# Patient Record
(demographics unavailable — no encounter records)

---

## 2025-03-07 NOTE — HISTORY OF PRESENT ILLNESS
[FreeTextEntry1] : This is a 24yo female with PMH of Hypothyroidism on levothyroxine, Thyroid nodule, Recently diagnosed paranoid Disorder, Anxiety and Psychomotor slowing, who presents for evaluation of outpatient labs showing elevated cortisol levels .   She presents with her father who answers most of the questions. Noted during visit that patient is very guarded and has delayed response to questions and flat affect.   Previous endocrinologist: Frandy Hawkins   Per Dad:   Patient was a high achiever, graduated undergrad in 2023 went to work January 2024. Was stressed and felt that people at work for following her home and tracking her. Became agitated and was hospitalized and was started on risperidone. As a results she developed amenorrhea and galactorrhea at which point Risperidone was discontinued. She was let go from her job. She started a new job in November 2024, became very suspicious and anxious and has not been able to work. During this time she also began training for a marathon which she is still training for currently. Mother reports irregular menstrual cycle lengths particularly length of periods have decreased to 2-3 days. She had labs at [facility] on 2/23/25 that showed a cortisol total level of 24.5 collected at 9am which was elevated.  Family noticed worsening staring into space, slow response, short term memory issues and brain fog. She was seeing a new psychiatrist and sent to the emergency for evaluation for worsening symptoms. She also had reports significant fatigue after running.   She was hospitalized from February 25 to March 3 2025 at [facility]. Per review of records in [facility], she had extensive work up for secondary etiology. She had CT chest/abdomen/ pelvis that was unremarkable. She had MRI of head that showed acute sinusitis but was otherwise unremarkable. She has had LP to r/o neurological reasons for these psychiatric changes. Toro panel pending, but csf chemistry was wnl and viral csf panel was negative. EEG was performed which showed no seizure activity. Labs during that admission significant for elevated CK and LFTs consistent with rhabdomyolysis which was treated with IV fluids and resolved. Psychiatry was consulted and she was started on olanzapine 5mg, which showed some improvement in her mental status per father. Endocrinology was consulted as well during hospitalization for Cushing's rule out given remote hx of elevated cortisol level. There was low suspicion of Cushing's psychosis given she did not have any signs (No metabolic derangement, no Cushingoid features).   Today she reports swelling in her hands and in her feet. Reports fatigue and constipation. She is compliant with her medications. Denies easy bruising of skin, denies weight gain, denies palpitations, denies abdominal striae, denies acne/ increased hair growth. Menarche at age 10. Currently with regular cycles but shorter days of bleed 2 days recently.   Hypothyroid Hx Diagnosed more than 3 years ago on blood work.  Takes levothyroxine 50mcg qd, takes correctly in the morning.  Family HX of hypothyroid in grandmother. Maternal grandmother has throat cancer but it is not  TSH in hospital 2/25/25: 2.423   Allergies: No allergies   Medications: Levothyroxine 50mcg qd  Olanzapine 5mg qd   Smoke: Denies Alcohol: No, occasional glass of wine

## 2025-03-07 NOTE — PHYSICAL EXAM
[Alert] : alert [No Acute Distress] : no acute distress [Normal Sclera/Conjunctiva] : normal sclera/conjunctiva [Normal Outer Ear/Nose] : the ears and nose were normal in appearance [Normal Hearing] : hearing was normal [Normal Nasal Mucosa] : the nasal mucosa was normal [No Neck Mass] : no neck mass was observed [No LAD] : no lymphadenopathy [Thyroid Not Enlarged] : the thyroid was not enlarged [No Thyroid Nodules] : no palpable thyroid nodules [No Respiratory Distress] : no respiratory distress [No Accessory Muscle Use] : no accessory muscle use [Clear to Auscultation] : lungs were clear to auscultation bilaterally [Normal S1, S2] : normal S1 and S2 [Regular Rhythm] : with a regular rhythm [No Edema] : no peripheral edema [Normal Bowel Sounds] : normal bowel sounds [Soft] : abdomen soft [Normal Gait] : normal gait [No Rash] : no rash [No Skin Lesions] : no skin lesions [Oriented x3] : oriented to person, place, and time [Abdominal Striae] : no abdominal striae [Acanthosis Nigricans] : no acanthosis nigricans

## 2025-03-07 NOTE — HISTORY OF PRESENT ILLNESS
[FreeTextEntry1] : This is a 22yo female with PMH of Hypothyroidism on levothyroxine, Thyroid nodule, Recently diagnosed paranoid Disorder, Anxiety and Psychomotor slowing, who presents for evaluation of outpatient labs showing elevated cortisol levels .   She presents with her father who answers most of the questions. Noted during visit that patient is very guarded and has delayed response to questions and flat affect.   Previous endocrinologist: Frandy Hawkins   Per Dad:   Patient was a high achiever, graduated undergrad in 2023 went to work January 2024. Was stressed and felt that people at work for following her home and tracking her. Became agitated and was hospitalized and was started on risperidone. As a results she developed amenorrhea and galactorrhea at which point Risperidone was discontinued. She was let go from her job. She started a new job in November 2024, became very suspicious and anxious and has not been able to work. During this time she also began training for a marathon which she is still training for currently. Mother reports irregular menstrual cycle lengths particularly length of periods have decreased to 2-3 days. She had labs at [facility] on 2/23/25 that showed a cortisol total level of 24.5 collected at 9am which was elevated.  Family noticed worsening staring into space, slow response, short term memory issues and brain fog. She was seeing a new psychiatrist and sent to the emergency for evaluation for worsening symptoms. She also had reports significant fatigue after running.   She was hospitalized from February 25 to March 3 2025 at [facility]. Per review of records in [facility], she had extensive work up for secondary etiology. She had CT chest/abdomen/ pelvis that was unremarkable. She had MRI of head that showed acute sinusitis but was otherwise unremarkable. She has had LP to r/o neurological reasons for these psychiatric changes. Toro panel pending, but csf chemistry was wnl and viral csf panel was negative. EEG was performed which showed no seizure activity. Labs during that admission significant for elevated CK and LFTs consistent with rhabdomyolysis which was treated with IV fluids and resolved. Psychiatry was consulted and she was started on olanzapine 5mg, which showed some improvement in her mental status per father. Endocrinology was consulted as well during hospitalization for Cushing's rule out given remote hx of elevated cortisol level. There was low suspicion of Cushing's psychosis given she did not have any signs (No metabolic derangement, no Cushingoid features).   Today she reports swelling in her hands and in her feet. Reports fatigue and constipation. She is compliant with her medications. Denies easy bruising of skin, denies weight gain, denies palpitations, denies abdominal striae, denies acne/ increased hair growth. Menarche at age 10. Currently with regular cycles but shorter days of bleed 2 days recently.   Hypothyroid Hx Diagnosed more than 3 years ago on blood work.  Takes levothyroxine 50mcg qd, takes correctly in the morning.  Family HX of hypothyroid in grandmother. Maternal grandmother has throat cancer but it is not  TSH in hospital 2/25/25: 2.423   Allergies: No allergies   Medications: Levothyroxine 50mcg qd  Olanzapine 5mg qd   Smoke: Denies Alcohol: No, occasional glass of wine

## 2025-03-07 NOTE — REVIEW OF SYSTEMS
[Fatigue] : no fatigue [Decreased Appetite] : appetite not decreased [Visual Field Defect] : no visual field defect [Dry Eyes] : no dryness [Dysphagia] : no dysphagia [Neck Pain] : no neck pain [Dysphonia] : no dysphonia [Chest Pain] : no chest pain [Slow Heart Rate] : heart rate is not slow [Palpitations] : no palpitations [Fast Heart Rate] : heart rate is not fast [Shortness Of Breath] : no shortness of breath [Cough] : no cough [Nausea] : no nausea [Constipation] : no constipation [Vomiting] : no vomiting [Diarrhea] : no diarrhea [Polyuria] : no polyuria [Irregular Menses] : regular menses [Joint Pain] : no joint pain [Muscle Weakness] : no muscle weakness [Acanthosis] : no acanthosis  [Acne] : no acne [Dry Skin] : no dry skin [Headaches] : no headaches [Dizziness] : no dizziness [Tremors] : no tremors [Polydipsia] : no polydipsia [Cold Intolerance] : no cold intolerance [Heat Intolerance] : no heat intolerance

## 2025-03-07 NOTE — ASSESSMENT
[FreeTextEntry1] : This is a 22yo female with PMH of Hypothyroidism on levothyroxine, Thyroid nodule, recently diagnosed paranoid Disorder, Anxiety and Psychomotor slowing, who presents for evaluation of outpatient labs showing elevated cortisol levels.  #Elevated Cortisol #Irregular Menstrual cycles  - She had one random cortisol elevation. She has no other symptoms of Cushing's and does not appear cushingoid. She has no metabolic derangements otherwise. MRI of Brain at [facility] also unremarkable.  - She has been training for marathon and has had poor PO intake. She also had rhabdomyolysis from excessive running during the last hospitalization. Menstrual irregularities can be due to excessive training.  - Suspect cognitive changes/ paranoia are more psychiatric in nature given extensive work up for secondary etiology at [facility] has been unremarkable.   Plan: - Will obtain midnight cortisol levels x2 although lows suspicion for Cushing's  - F/U FSH, LH, estradiol, progesterone, testosterone and prolactin level to work up menstrual irregularity.  - RTC in 3 months.  - Will call patient/ mother (Amy [phone]) with lab results.   #Hypothyroid - doubt symptoms are due to thyroid as her TFTs recently were normal  - C/w levothyroxine 50mcg qd  - F/u TFTs and thyroid antibodies.   Wanda Cristobal Endocrinology Fellow Discussed with Dr. Harding

## 2025-03-07 NOTE — ASSESSMENT
[FreeTextEntry1] : This is a 22yo female with PMH of Hypothyroidism on levothyroxine, Thyroid nodule, recently diagnosed paranoid Disorder, Anxiety and Psychomotor slowing, who presents for evaluation of outpatient labs showing elevated cortisol levels.  #Elevated Cortisol #Irregular Menstrual cycles  - She had one random cortisol elevation. She has no other symptoms of Cushing's and does not appear cushingoid. She has no metabolic derangements otherwise. MRI of Brain at [facility] also unremarkable.  - She has been training for marathon and has had poor PO intake. She also had rhabdomyolysis from excessive running during the last hospitalization. Menstrual irregularities can be due to excessive training.  - Suspect cognitive changes/ paranoia are more psychiatric in nature given extensive work up for secondary etiology at [facility] has been unremarkable.   Plan: - Will obtain midnight cortisol levels x2 although lows suspicion for Cushing's  - F/U FSH, LH, estradiol, progesterone, testosterone and prolactin level to work up menstrual irregularity.  - RTC in 3 months.  - Will call patient/ mother (Amy [phone]) with lab results.   #Hypothyroid - doubt symptoms are due to thyroid as her TFTs recently were normal  - C/w levothyroxine 50mcg qd  - F/u TFTs and thyroid antibodies.   Wanda Cristobal Endocrinology Fellow Discussed with Dr. Harding

## 2025-03-07 NOTE — END OF VISIT
[FreeTextEntry3] : I agree with the plan of Dr. Cristobal Will check salivary cortisol levels x 2 for Cushings disease ( low suspicion)  Will check thyroid ultrasound 2/2 previous history of thyroid nodules

## 2025-04-03 NOTE — PSYCHOSOCIAL ASSESSMENT
[None known] : None known [Competitive and integrated employment] : Competitive and integrated employment [35 hours or more] : 35 hours or more [Financially stable] : financially stable [Client's parents (biological, adoptive, stepparent)] : client's parents (biological, adoptive, stepparent) [No] : Patient has personal representation (legal guardian, representative payee, conservatorship)? No [FreeTextEntry2] : Client has several strengths including motivation for psychotherapy, supportive family, long-term partner, hobbies and interests, educational attainment, and life goals [FreeTextEntry3] : Client and mother report that prior to February 2024, client had good social functioning [FreeTextEntry1] : client currently working F/T but situation complicated by recent hospitalization  [FreeTextEntry7] : Amy (Mother)

## 2025-04-03 NOTE — HISTORY OF PRESENT ILLNESS
[Not Applicable] : Not applicable [FreeTextEntry1] : Client is a 23-year-old, cis-gender, , female, employed as , domiciled with family (mother, sister, grandmother) in private residence, no past hx of SI/I/P, no past hx of HI/I/P, no substance use, no trauma hx, hx of hypothyroidism ( treated w/ levothyroxine) with 1 past psychiatric hospitalization (February 2025) for "acute psychosis", 1 past CPEP visit (February 2024- not admitted- prescribed risperidone), hx of outpatient mental health services (psychotherapy & psychiatry Feb 2024-June 2024), currently presenting for eligibility screen for OTNY services in setting of recent hospital discharge for acute psychosis.  [FreeTextEntry2] : Client reported no past hx of mental health services prior to February 2024. In February 2024 client began her first job, which she found stressful. Her mother noticed changes in her behavior including speech latency, alogia, affective blunting, and mild paranoia (fears someone from work was following her, worried about office gossip/bullying). At mother's encouragement client went to [facility], was treated for "psychosis with paranoia" with Risperidone and was not admitted. Client then saw private psychiatrist (Daniel Danielle MD) and psychotherapist (Jackson Felix) until June 2024, when she discontinued risperidone due to increased prolactin levels w/ amenorrhea and lactation. Client reports psychotherapy was helpful in coping with discontinuation of medication and work stressors. Client and mother report no symptoms from June 2024-January 2025. In January 2025, both client and mother noticed changes in client's behavior, including "brain fog", pervasive indecisiveness/aboulomania, slow speech latency, alogia, insomnia, sensitivity to touch, sensitivity to ambient noise, and mild mistrust/suspiciousness. Mother reports this presentation waxed and waned. She denied auditory or visual hallucinations, no evidence of adrienne. Client then presented to [facility] where she was psychiatrically admitted for "acute psychosis" and treated with olanzapine. Client's discharge paperwork notes she was treated for agitation, acute psychosis, elevated CPK, encephalopathy, non-traumatic rhabdomyolysis, and musculoskeletal hypermobility. w/ request to follow up at [facility] for CSF autoimmune panel, no psychiatric care follow-up indicated on paperwork. Client reports improvement in symptoms with olanzapine.  [FreeTextEntry3] : risperidone -  February 2024-June 2024 olanzapine 5mg -  February 2025- present
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
## 2025-04-03 NOTE — PHYSICAL EXAM
[1] : 1 - Mild suspicion [0] : 0 - Not present [Average] : average [Cooperative] : cooperative [Euthymic] : euthymic [Constricted] : constricted [Clear] : clear [Linear/Goal Directed] : linear/goal directed [None Reported] : none reported [Above average] : above average [WNL] : within normal limits [FreeTextEntry6] : mild disorganization at times, generally grossly liner and goal-directed [de-identified] : Client presented as grossly linear with some mild disorganization / inconsistencies in her responses; w/ slight mistrust towards the examiners, however cooperative and forthcoming
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
## 2025-04-03 NOTE — PHYSICAL EXAM
[1] : 1 - Mild suspicion [0] : 0 - Not present [Average] : average [Cooperative] : cooperative [Euthymic] : euthymic [Constricted] : constricted [Clear] : clear [Linear/Goal Directed] : linear/goal directed [None Reported] : none reported [Above average] : above average [WNL] : within normal limits [FreeTextEntry6] : mild disorganization at times, generally grossly liner and goal-directed [de-identified] : Client presented as grossly linear with some mild disorganization / inconsistencies in her responses; w/ slight mistrust towards the examiners, however cooperative and forthcoming
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
## 2025-04-03 NOTE — PLAN
[Unable to complete assessment] : Unable to complete assessment [FreeTextEntry3] : Diagnostic uncertainty, needs medical clearance [FreeTextEntry4] : - client is not currently approved for enrollment in [facility] due to pending CSF autoimmune panel results / diagnostic uncertainty  - client is recommended to follow up with her PCP for a refill of olanzapine  - OT[facility] team will f/u with inpatient [facility] team to r/o autoimmune encephalitis

## 2025-04-03 NOTE — PHYSICAL EXAM
[1] : 1 - Mild suspicion [0] : 0 - Not present [Average] : average [Cooperative] : cooperative [Euthymic] : euthymic [Constricted] : constricted [Clear] : clear [Linear/Goal Directed] : linear/goal directed [None Reported] : none reported [Above average] : above average [WNL] : within normal limits [FreeTextEntry6] : mild disorganization at times, generally grossly liner and goal-directed [de-identified] : Client presented as grossly linear with some mild disorganization / inconsistencies in her responses; w/ slight mistrust towards the examiners, however cooperative and forthcoming

## 2025-04-03 NOTE — DISCUSSION/SUMMARY
[Low acute suicide risk] : Low acute suicide risk [No] : No [Not clinically indicated] : Safety Plan completed/updated (for individuals at risk): Not clinically indicated [FreeTextEntry1] : Client has chronic risk factors of harm to self and others including hx of psychosis and hx of psychiatric hospitalization. These risks are mitigated by her future-orientation, motivation for tx, family/social supports, employment hx, no past or current SI/I/P and no past or current HI/I/P. At time of exam there were no acute risks. Client remained in good behavioral control and appear suitable for an outpatient level of care.

## 2025-04-03 NOTE — RISK ASSESSMENT
[Clinical Interview] : Clinical Interview [No] : No [Psychotic disorder] : psychotic disorder [Psychosis] : psychosis [Change in provider or treatment (i.e., medications, psychotherapy, milieu)] : change in provider or treatment (i.e., medications, psychotherapy, milieu) [Recent inpatient discharge] : recent inpatient discharge [Recent onset of psychiatric illness] : recent onset of psychiatric illness [Identifies reasons for living] : identifies reasons for living [Supportive social network of family or friends] : supportive social network of family or friends [Ability to cope with stress] : ability to cope with stress [Positive therapeutic relationships] : positive therapeutic relationships [Responsibility to children, family, or others] : responsibility to children, family, or others [Frustration tolerance] : frustration tolerance [Engaged in work or school] : engaged in work or school [None in the patient's lifetime] : None in the patient's lifetime [None Known] : none known [No known risk factors] : No known risk factors [Residential stability] : residential stability [Relationship stability] : relationship stability [Affective stability] : affective stability [Sobriety] : sobriety [Engagement in treatment] : engagement in treatment [TextBox_32] : No evidence of past or current SI/I/P

## 2025-04-03 NOTE — HISTORY OF PRESENT ILLNESS
[Not Applicable] : Not applicable [FreeTextEntry1] : Client is a 23-year-old, cis-gender, , female, employed as , domiciled with family (mother, sister, grandmother) in private residence, no past hx of SI/I/P, no past hx of HI/I/P, no substance use, no trauma hx, hx of hypothyroidism ( treated w/ levothyroxine) with 1 past psychiatric hospitalization (February 2025) for "acute psychosis", 1 past CPEP visit (February 2024- not admitted- prescribed risperidone), hx of outpatient mental health services (psychotherapy & psychiatry Feb 2024-June 2024), currently presenting for eligibility screen for OT[facility] services in setting of recent hospital discharge for acute psychosis.  [FreeTextEntry2] : Client reported no past hx of mental health services prior to February 2024. In February 2024 client began her first job, which she found stressful. Her mother noticed changes in her behavior including speech latency, alogia, affective blunting, and mild paranoia (fears someone from work was following her, worried about office gossip/bullying). At mother's encouragement client went to [facility], was treated for "psychosis with paranoia" with Risperidone and was not admitted. Client then saw private psychiatrist (Daniel Danielle MD) and psychotherapist (Jackson Felix) until June 2024, when she discontinued risperidone due to increased prolactin levels w/ amenorrhea and lactation. Client reports psychotherapy was helpful in coping with discontinuation of medication and work stressors. Client and mother report no symptoms from June 2024-January 2025. In January 2025, both client and mother noticed changes in client's behavior, including "brain fog", pervasive indecisiveness/aboulomania, slow speech latency, alogia, insomnia, sensitivity to touch, sensitivity to ambient noise, and mild mistrust/suspiciousness. Mother reports this presentation waxed and waned. She denied auditory or visual hallucinations, no evidence of adrienne. Client then presented to [facility] where she was psychiatrically admitted for "acute psychosis" and treated with olanzapine. Client's discharge paperwork notes she was treated for agitation, acute psychosis, elevated CPK, encephalopathy, non-traumatic rhabdomyolysis, and musculoskeletal hypermobility. w/ request to follow up at [facility] for CSF autoimmune panel, no psychiatric care follow-up indicated on paperwork. Client reports improvement in symptoms with olanzapine.  [FreeTextEntry3] : risperidone -  February 2024-June 2024 olanzapine 5mg -  February 2025- present

## 2025-04-03 NOTE — PLAN
[Unable to complete assessment] : Unable to complete assessment [FreeTextEntry3] : Diagnostic uncertainty, needs medical clearance [FreeTextEntry4] : - client is not currently approved for enrollment in OTNY due to pending CSF autoimmune panel results / diagnostic uncertainty  - client is recommended to follow up with her PCP for a refill of olanzapine  - OTNY team will f/u with inpatient [facility] team to r/o autoimmune encephalitis
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
## 2025-04-03 NOTE — REASON FOR VISIT
[Patient preference] : as per patient preference [Telehealth (audio & video) - Individual/Group] : This visit was provided via telehealth using real-time 2-way audio visual technology. [Other Location: e.g. Home (Enter Location, City,State)___] : The provider was located at [unfilled]. [Home] : The patient, [unfilled], was located at home, [unfilled], at the time of the visit. [Mother] : mother [Participant(s) identity verified] : Participant(s) identity verified. [For new patient(s) – practitioner identifies themselves to participants] : Practitioner identifies themselves to participants. [Verbal consent obtained from patient/other participant(s)] : Verbal consent for telehealth/telephonic services obtained from patient/other participant(s) [Self-Referred] : Self-Referred [Not Applicable] : Not Applicable [Patient] : Patient [Collateral - Name/Contact Info/Relationship:___] : Collateral: [unfilled] [FreeTextEntry4] : 1:00 [FreeTextEntry5] : 3:00 [FreeTextEntry3] : Evaluation for suitability for OT[facility] [FreeTextEntry2] : Eligibility for OTNY services / First Episode Psychosis [FreeTextEntry1] : First Episode Psychosis / Eligibility eval for OTNY program

## 2025-04-03 NOTE — PHYSICAL EXAM
[1] : 1 - Mild suspicion [0] : 0 - Not present [Average] : average [Cooperative] : cooperative [Euthymic] : euthymic [Constricted] : constricted [Clear] : clear [Linear/Goal Directed] : linear/goal directed [None Reported] : none reported [Above average] : above average [WNL] : within normal limits [FreeTextEntry6] : mild disorganization at times, generally grossly liner and goal-directed [de-identified] : Client presented as grossly linear with some mild disorganization / inconsistencies in her responses; w/ slight mistrust towards the examiners, however cooperative and forthcoming

## 2025-04-03 NOTE — HISTORY OF PRESENT ILLNESS
[Not Applicable] : Not applicable [FreeTextEntry1] : Client is a 23-year-old, cis-gender, , female, employed as , domiciled with family (mother, sister, grandmother) in private residence, no past hx of SI/I/P, no past hx of HI/I/P, no substance use, no trauma hx, hx of hypothyroidism ( treated w/ levothyroxine) with 1 past psychiatric hospitalization (February 2025) for "acute psychosis", 1 past CPEP visit (February 2024- not admitted- prescribed risperidone), hx of outpatient mental health services (psychotherapy & psychiatry Feb 2024-June 2024), currently presenting for eligibility screen for OTNY services in setting of recent hospital discharge for acute psychosis.  [FreeTextEntry2] : Client reported no past hx of mental health services prior to February 2024. In February 2024 client began her first job, which she found stressful. Her mother noticed changes in her behavior including speech latency, alogia, affective blunting, and mild paranoia (fears someone from work was following her, worried about office gossip/bullying). At mother's encouragement client went to [facility], was treated for "psychosis with paranoia" with Risperidone and was not admitted. Client then saw private psychiatrist (Daniel Danielle MD) and psychotherapist (Jackson Felix) until June 2024, when she discontinued risperidone due to increased prolactin levels w/ amenorrhea and lactation. Client reports psychotherapy was helpful in coping with discontinuation of medication and work stressors. Client and mother report no symptoms from June 2024-January 2025. In January 2025, both client and mother noticed changes in client's behavior, including "brain fog", pervasive indecisiveness/aboulomania, slow speech latency, alogia, insomnia, sensitivity to touch, sensitivity to ambient noise, and mild mistrust/suspiciousness. Mother reports this presentation waxed and waned. She denied auditory or visual hallucinations, no evidence of adrienne. Client then presented to [facility] where she was psychiatrically admitted for "acute psychosis" and treated with olanzapine. Client's discharge paperwork notes she was treated for agitation, acute psychosis, elevated CPK, encephalopathy, non-traumatic rhabdomyolysis, and musculoskeletal hypermobility. w/ request to follow up at [facility] for CSF autoimmune panel, no psychiatric care follow-up indicated on paperwork. Client reports improvement in symptoms with olanzapine.  [FreeTextEntry3] : risperidone -  February 2024-June 2024 olanzapine 5mg -  February 2025- present
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
## 2025-04-03 NOTE — PLAN
[Unable to complete assessment] : Unable to complete assessment [FreeTextEntry3] : Diagnostic uncertainty, needs medical clearance [FreeTextEntry4] : - client is not currently approved for enrollment in OT[facility] due to pending CSF autoimmune panel results / diagnostic uncertainty  - client is recommended to follow up with her PCP for a refill of olanzapine  - OTNY team will f/u with inpatient [facility] team to r/o autoimmune encephalitis

## 2025-04-03 NOTE — HISTORY OF PRESENT ILLNESS
[Not Applicable] : Not applicable [FreeTextEntry1] : Client is a 23-year-old, cis-gender, , female, employed as , domiciled with family (mother, sister, grandmother) in private residence, no past hx of SI/I/P, no past hx of HI/I/P, no substance use, no trauma hx, hx of hypothyroidism ( treated w/ levothyroxine) with 1 past psychiatric hospitalization (February 2025) for "acute psychosis", 1 past CPEP visit (February 2024- not admitted- prescribed risperidone), hx of outpatient mental health services (psychotherapy & psychiatry Feb 2024-June 2024), currently presenting for eligibility screen for OTNY services in setting of recent hospital discharge for acute psychosis.  [FreeTextEntry2] : Client reported no past hx of mental health services prior to February 2024. In February 2024 client began her first job, which she found stressful. Her mother noticed changes in her behavior including speech latency, alogia, affective blunting, and mild paranoia (fears someone from work was following her, worried about office gossip/bullying). At mother's encouragement client went to [facility], was treated for "psychosis with paranoia" with Risperidone and was not admitted. Client then saw private psychiatrist (Daniel Danielle MD) and psychotherapist (Jackson Felix) until June 2024, when she discontinued risperidone due to increased prolactin levels w/ amenorrhea and lactation. Client reports psychotherapy was helpful in coping with discontinuation of medication and work stressors. Client and mother report no symptoms from June 2024-January 2025. In January 2025, both client and mother noticed changes in client's behavior, including "brain fog", pervasive indecisiveness/aboulomania, slow speech latency, alogia, insomnia, sensitivity to touch, sensitivity to ambient noise, and mild mistrust/suspiciousness. Mother reports this presentation waxed and waned. She denied auditory or visual hallucinations, no evidence of adrienne. Client then presented to [facility] where she was psychiatrically admitted for "acute psychosis" and treated with olanzapine. Client's discharge paperwork notes she was treated for agitation, acute psychosis, elevated CPK, encephalopathy, non-traumatic rhabdomyolysis, and musculoskeletal hypermobility. w/ request to follow up at [facility] for CSF autoimmune panel, no psychiatric care follow-up indicated on paperwork. Client reports improvement in symptoms with olanzapine.  [FreeTextEntry3] : risperidone -  February 2024-June 2024 olanzapine 5mg -  February 2025- present
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
## 2025-04-25 NOTE — PHYSICAL EXAM
[Average] : average [Cooperative] : cooperative [Euthymic] : euthymic [Full] : full [Clear] : clear [Linear/Goal Directed] : linear/goal directed [WNL] : within normal limits [de-identified] : unable to formally assess

## 2025-04-25 NOTE — PLAN
[FreeTextEntry2] : "Prevent psychosis relapse" "Decrease work stress" "Improve family relationships"  - Formal TP & goals to follow  [Integrative Therapy] : Integrative Therapy  [Supportive Therapy] : Supportive Therapy [de-identified] : Client arrived on time. Session focused on orienting client to the OTNY program and learning more about her goals for psychotherapy. Writer oriented client to the various components of the program including SEES, Peer Support, and groups. Client expressed interest in connecting with Peer. Writer and client also discussed client's goals for psychotherapy and her comfort level with family involvement. Client expressed being comfortable with her mother being part of her care. Client discussed three goals: 1) preventing psychosis relapse; 2) decreasing stress at work; and 3) improving family relationships. Writer and client also discussed client's experiences in college during COVID-19 pandemic. Client agreed to follow-up on Friday at 9am. She was in good behavioral control and receptive to all interventions. Interventions included: rapport building, goal setting, orientation to the program.  [Recommended Frequency of Visits: ____] : Recommended frequency of visits: [unfilled] [Return in ____ week(s)] : Return in [unfilled] week(s) [FreeTextEntry1] : - continue enrollment in OT[facility] - medications as per Dr. Waite  - Continue weekly psychotherapy - SEES, Peer Support, as needed

## 2025-04-25 NOTE — REASON FOR VISIT
[Patient preference] : as per patient preference [Starting, patient seen in-person within last 6 months] : Telehealth services are being started as patient has seen in-person within last 6 months. [Telehealth (audio & video) - Individual/Group] : This visit was provided via telehealth using real-time 2-way audio visual technology. [Other Location: e.g. Home (Enter Location, City,State)___] : The patient, [unfilled], was located at [unfilled] at the time of the visit. [Participant(s) identity verified] : Participant(s) identity verified. [Verbal consent obtained from patient/other participant(s)] : Verbal consent for telehealth/telephonic services obtained from patient/other participant(s) [FreeTextEntry4] : 9am [FreeTextEntry5] : 9:45am [FreeTextEntry1] : First episode psychosis / initial psychotherapy session

## 2025-04-30 NOTE — DISCUSSION/SUMMARY
[Initial Plan] : Initial Plan [Able to manage surrounding demands and opportunities] : able to manage surrounding demands and opportunities [Able to exercise self-direction] : able to exercise self-direction [Able to set and pursue goals] : able to set and pursue goals [Adherent to treatment recommendations] : adherent to treatment recommendations [Articulate] : articulate [Attempting to realize their potential] : Attempting to realize their potential [Cognitively intact] : cognitively intact [Good impulse control] : good impulse control [Insightful] : insightful [Creative] : creative [Intelligent] : intelligent [Motivated to participate in treatment] : motivated to participate in treatment [Health literacy] : health literacy [Motivated to maintain or improve physical health] : motivated to maintain or improve physical health [In good health] : in good health [Financially stable] : financially stable [Has vocational interests or hobbies] : has vocational interests or hobbies [Part of a supportive family] : part of a supportive family [Steady employment] : steady employment [Housing stability] : housing stability [High level of education] : high level of education [English fluency] : English fluency [Connected to healthcare] : connected to healthcare [Access to safe outdoor spaces] : access to safe outdoor spaces [Social supports] : social supports [FreeTextEntry2] : 04/14/26 [FreeTextEntry3] : 04/14/25 [Mental Health] : Mental Health [Interpersonal Relationships] : Interpersonal Relationships [Initial] : Initial [every ___ months] : every [unfilled] months [___ times a week] : [unfilled] times a week [None - Reason others did not participate:] : None - Reason others did not participate:  [Yes] : Yes [Psychiatric Provider/Prescriber] : Psychiatric Provider/Prescriber [Therapist] : Therapist [FreeTextEntry1] : First Episode Psychosis  [FreeTextEntry4] : "I want to prevent another episode of psychosis" [de-identified] : Participant will identify at least three warning signs of psychosis relapse as evidenced by in session report.  [de-identified] : 04/16/26 [de-identified] : Participant will identify at least three coping skills for increased symptoms as evidenced by in session report.  [de-identified] : 04/16/25 [FreeTextEntry5] : First Episode Psychosis [facility]: - Weekly psychotherapy - Monthly psychiatry - SEEs as needed - Peer support as needed - Groups as needed - Family Support  [de-identified] : Dr. Vazquez [de-identified] : Dr. Brown [de-identified] : N/A

## 2025-04-30 NOTE — RISK ASSESSMENT
[Identifies reasons for living] : identifies reasons for living [Supportive social network of family or friends] : supportive social network of family or friends [Ability to cope with stress] : ability to cope with stress [Positive therapeutic relationships] : positive therapeutic relationships [Responsibility to children, family, or others] : responsibility to children, family, or others [Frustration tolerance] : frustration tolerance [Engaged in work or school] : engaged in work or school [FreeTextEntry8] : No evidence of SI/I/P [FreeTextEntry7] : No evidence of HI/I/P [FreeTextEntry9] : Client has chronic risk factors of harm to self or others: hx of psychosis. Client's risk is mitigated by no past hx of SI/I/P, no past hx of HI/I/P, no past psychiatric hospitalizations, stable housing, supportive family & motivation for tx. Client has no acute risks and is suitable for an outpatient level of care.

## 2025-05-02 NOTE — PHYSICAL EXAM
[Average] : average [Cooperative] : cooperative [Euthymic] : euthymic [Full] : full [Clear] : clear [Linear/Goal Directed] : linear/goal directed [WNL] : within normal limits [de-identified] : unable to formally assess

## 2025-05-02 NOTE — RISK ASSESSMENT
[No, patient denies ideation or behavior] : No, patient denies ideation or behavior [FreeTextEntry8] : No evidence of SI/I/P [FreeTextEntry7] : No evidence of HI/I/P [FreeTextEntry9] : Client has chronic risk factor of hx of psychosis. Her risk is mitigated by no past SI/I/P, no past HI/I/P, no past psychiatric hospitalizations, educational attainment, stable housing, employment, no substance use, and motivation for treatment. Client has no acute risks and is suitable for outpatient level of care. Her risk is low.  [Low acute suicide risk] : Low acute suicide risk

## 2025-05-02 NOTE — PLAN
[FreeTextEntry2] : "Prevent psychosis relapse" "Decrease work stress" "Improve family relationships"  [Integrative Therapy] : Integrative Therapy  [Supportive Therapy] : Supportive Therapy [de-identified] : Client arrived on time. Session focused on client's thoughts and feelings regarding recent weight gain and interpersonal dynamics at work. Writer and client discussed client's past difficulties with interpersonal workplace situations and her development in understanding workplace culture. Client discussed a reticence to speak about herself to others due to past difficulties at work. Writer and client also discussed client's feelings of sadness regarding recent weight gain, client discussed positive coping through reading an inspirational book, taking a "mental health day" from going into the office, and decreasing snacks. Client agreed to follow-up on Friday at 9am. She was in good behavioral control and receptive to all interventions. Interventions included: integrative psychotherapy, rapport building, reinforcing client's coping skills.   [Recommended Frequency of Visits: ____] : Recommended frequency of visits: [unfilled] [Return in ____ week(s)] : Return in [unfilled] week(s) [FreeTextEntry1] : - continue enrollment in [facility] - medications as per Dr. Waite  - Continue weekly psychotherapy - SEES, Peer Support, as needed

## 2025-05-05 NOTE — PLAN
[Medication education provided] : Medication education provided. [Rationale for medication choices, possible risks/precautions, benefits, alternative treatment choices, and consequences of non-treatment discussed] : Rationale for medication choices, possible risks/precautions, benefits, alternative treatment choices, and consequences of non-treatment discussed with patient/family/caregiver  [Order(s) for medication placed in [facility] EHR] : Medication [FreeTextEntry5] : #) continue olanzapine 5mg PO Qdaily #) RN visit for AIMS, VS, labs, Medical core visit #) continue individual therpay

## 2025-05-05 NOTE — PLAN
[Medication education provided] : Medication education provided. [Rationale for medication choices, possible risks/precautions, benefits, alternative treatment choices, and consequences of non-treatment discussed] : Rationale for medication choices, possible risks/precautions, benefits, alternative treatment choices, and consequences of non-treatment discussed with patient/family/caregiver  [Order(s) for medication placed in [facility] EHR] : Medication [FreeTextEntry5] : #) continue olanzapine 5mg PO Qdaily #) RN visit for AIMS, VS, labs, Medical core visit #) continue individual therpay

## 2025-05-05 NOTE — PHYSICAL EXAM
[Average] : average [Cooperative] : cooperative [Euthymic] : euthymic [Constricted] : constricted [Clear] : clear [Linear/Goal Directed] : linear/goal directed [WNL] : within normal limits [de-identified] : fair [de-identified] : fair

## 2025-05-05 NOTE — PHYSICAL EXAM
[Average] : average [Cooperative] : cooperative [Euthymic] : euthymic [Constricted] : constricted [Clear] : clear [Linear/Goal Directed] : linear/goal directed [WNL] : within normal limits [de-identified] : fair [de-identified] : fair

## 2025-05-05 NOTE — HISTORY OF PRESENT ILLNESS
[Not Applicable] : Not applicable [FreeTextEntry1] : Client is a 23-year-old, cis-gender, , female, employed as , domiciled with family (mother, sister, grandmother) in private residence, no past hx of SI/I/P, no past hx of HI/I/P, no substance use, no trauma hx, hx of hypothyroidism ( treated w/ levothyroxine) with 1 past psychiatric hospitalization (February 2025) for "acute psychosis", 1 past CPEP visit (February 2024- not admitted- prescribed risperidone), hx of outpatient mental health services (psychotherapy & psychiatry Feb 2024-June 2024), currently presenting for eligibility screen for [facility] services in setting of recent hospital discharge for acute psychosis.  [FreeTextEntry2] : Client reported no past hx of mental health services prior to February 2024. In February 2024 client began her first job, which she found stressful. Her mother noticed changes in her behavior including speech latency, alogia, affective blunting, and mild paranoia (fears someone from work was following her, worried about office gossip/bullying). At mother's encouragement client went to [facility], was treated for "psychosis with paranoia" with Risperidone and was not admitted. Client then saw private psychiatrist (Daniel Danielle MD) and psychotherapist (Jackson Felix) until June 2024, when she discontinued risperidone due to increased prolactin levels w/ amenorrhea and lactation. Client reports psychotherapy was helpful in coping with discontinuation of medication and work stressors. Client and mother report no symptoms from June 2024-January 2025. In January 2025, both client and mother noticed changes in client's behavior, including "brain fog", pervasive indecisiveness/aboulomania, slow speech latency, alogia, insomnia, sensitivity to touch, sensitivity to ambient noise, and mild mistrust/suspiciousness. Mother reports this presentation waxed and waned. She denied auditory or visual hallucinations, no evidence of adrienne. Client then presented to [facility] where she was psychiatrically admitted for "acute psychosis" and treated with olanzapine. Client's discharge paperwork notes she was treated for agitation, acute psychosis, elevated CPK, encephalopathy, non-traumatic rhabdomyolysis, and musculoskeletal hypermobility. w/ request to follow up at [facility] for CSF autoimmune panel, no psychiatric care follow-up indicated on paperwork. Client reports improvement in symptoms with olanzapine.  [FreeTextEntry3] : risperidone -  February 2024-June 2024 olanzapine 5mg -  February 2025- present
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
## 2025-05-05 NOTE — REASON FOR VISIT
[Patient preference] : as per patient preference [Continuing, patient not seen in-person within last 12 months (provide details below)] : Telehealth services are continuing, patient not seen in-person within last 12 months.  [Telehealth (audio & video) - Individual/Group] : This visit was provided via telehealth using real-time 2-way audio visual technology. [Other Location: e.g. Home (Enter Location, City,State)___] : The provider was located at [unfilled]. [Home] : The patient, [unfilled], was located at home, [unfilled], at the time of the visit. [Patient's space is appropriate for telehealth and maintains privacy/confidentiality.] : Patient's space is appropriate for telehealth and maintains privacy/confidentiality. [Participant(s) identity verified] : Participant(s) identity verified. [Patient] : Patient [FreeTextEntry1] : Psychiatric follow up
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
## 2025-05-05 NOTE — HISTORY OF PRESENT ILLNESS
[Not Applicable] : Not applicable [FreeTextEntry1] : Client is a 23-year-old, cis-gender, , female, employed as , domiciled with family (mother, sister, grandmother) in private residence, no past hx of SI/I/P, no past hx of HI/I/P, no substance use, no trauma hx, hx of hypothyroidism ( treated w/ levothyroxine) with 1 past psychiatric hospitalization (February 2025) for "acute psychosis", 1 past CPEP visit (February 2024- not admitted- prescribed risperidone), hx of outpatient mental health services (psychotherapy & psychiatry Feb 2024-June 2024), currently presenting for eligibility screen for [facility] services in setting of recent hospital discharge for acute psychosis.  [FreeTextEntry2] : Client reported no past hx of mental health services prior to February 2024. In February 2024 client began her first job, which she found stressful. Her mother noticed changes in her behavior including speech latency, alogia, affective blunting, and mild paranoia (fears someone from work was following her, worried about office gossip/bullying). At mother's encouragement client went to [facility], was treated for "psychosis with paranoia" with Risperidone and was not admitted. Client then saw private psychiatrist (Daniel Dnaielle MD) and psychotherapist (Jackson Felix) until June 2024, when she discontinued risperidone due to increased prolactin levels w/ amenorrhea and lactation. Client reports psychotherapy was helpful in coping with discontinuation of medication and work stressors. Client and mother report no symptoms from June 2024-January 2025. In January 2025, both client and mother noticed changes in client's behavior, including "brain fog", pervasive indecisiveness/aboulomania, slow speech latency, alogia, insomnia, sensitivity to touch, sensitivity to ambient noise, and mild mistrust/suspiciousness. Mother reports this presentation waxed and waned. She denied auditory or visual hallucinations, no evidence of adrienne. Client then presented to [facility] where she was psychiatrically admitted for "acute psychosis" and treated with olanzapine. Client's discharge paperwork notes she was treated for agitation, acute psychosis, elevated CPK, encephalopathy, non-traumatic rhabdomyolysis, and musculoskeletal hypermobility. w/ request to follow up at [facility] for CSF autoimmune panel, no psychiatric care follow-up indicated on paperwork. Client reports improvement in symptoms with olanzapine.  [FreeTextEntry3] : risperidone -  February 2024-June 2024 olanzapine 5mg -  February 2025- present

## 2025-05-09 NOTE — PLAN
[FreeTextEntry2] : "Prevent psychosis relapse" "Decrease work stress" "Improve family relationships"  [CBT for Psychosis] : CBT for Psychosis  [Integrative Therapy] : Integrative Therapy  [Supportive Therapy] : Supportive Therapy [de-identified] : Client arrived on time. Session focused on client's thoughts and feelings regarding work/career and her current stage of life. Writer and client discussed client's past experiences of paranoia at work and client was able to reflect of the likely misperception of past events. Writer and client also discussed the new developmental stage of her family life and her goals for the next chapter in her own life. She was in good behavioral control and receptive to all interventions. Interventions included: integrative psychotherapy, rapport building, reinforcing client's coping skills.   [Recommended Frequency of Visits: ____] : Recommended frequency of visits: [unfilled] [Return in ____ week(s)] : Return in [unfilled] week(s) [FreeTextEntry1] : - continue enrollment in [facility] - medications as per Dr. Waite  - Continue weekly psychotherapy - SEES, Peer Support, as needed

## 2025-05-09 NOTE — REASON FOR VISIT
[Patient preference] : as per patient preference [Starting, patient seen in-person within last 6 months] : Telehealth services are being started as patient has seen in-person within last 6 months. [Telehealth (audio & video) - Individual/Group] : This visit was provided via telehealth using real-time 2-way audio visual technology. [Other Location: e.g. Home (Enter Location, City,State)___] : The patient, [unfilled], was located at [unfilled] at the time of the visit. [Participant(s) identity verified] : Participant(s) identity verified. [Verbal consent obtained from patient/other participant(s)] : Verbal consent for telehealth/telephonic services obtained from patient/other participant(s) [FreeTextEntry4] : 9am [FreeTextEntry5] : 9:45am [FreeTextEntry1] : Follow up psychotherapy session - First episode psychosis

## 2025-05-09 NOTE — PHYSICAL EXAM
[Average] : average [Cooperative] : cooperative [Euthymic] : euthymic [Constricted] : constricted [Clear] : clear [Linear/Goal Directed] : linear/goal directed [WNL] : within normal limits [de-identified] : fair [de-identified] : fair

## 2025-05-09 NOTE — HISTORY OF PRESENT ILLNESS
[Not Applicable] : Not applicable [FreeTextEntry1] : Client is a 23-year-old, cis-gender, , female, employed as , domiciled with family (mother, sister, grandmother) in private residence, no past hx of SI/I/P, no past hx of HI/I/P, no substance use, no trauma hx, hx of hypothyroidism ( treated w/ levothyroxine) with 1 past psychiatric hospitalization (February 2025) for "acute psychosis", 1 past CPEP visit (February 2024- not admitted- prescribed risperidone), hx of outpatient mental health services (psychotherapy & psychiatry Feb 2024-June 2024), currently presenting for eligibility screen for [facility] services in setting of recent hospital discharge for acute psychosis.  [FreeTextEntry2] : Client reported no past hx of mental health services prior to February 2024. In February 2024 client began her first job, which she found stressful. Her mother noticed changes in her behavior including speech latency, alogia, affective blunting, and mild paranoia (fears someone from work was following her, worried about office gossip/bullying). At mother's encouragement client went to [facility], was treated for "psychosis with paranoia" with Risperidone and was not admitted. Client then saw private psychiatrist (Daniel Danielle MD) and psychotherapist (Jackson Felix) until June 2024, when she discontinued risperidone due to increased prolactin levels w/ amenorrhea and lactation. Client reports psychotherapy was helpful in coping with discontinuation of medication and work stressors. Client and mother report no symptoms from June 2024-January 2025. In January 2025, both client and mother noticed changes in client's behavior, including "brain fog", pervasive indecisiveness/aboulomania, slow speech latency, alogia, insomnia, sensitivity to touch, sensitivity to ambient noise, and mild mistrust/suspiciousness. Mother reports this presentation waxed and waned. She denied auditory or visual hallucinations, no evidence of adrienne. Client then presented to [place] [facility] where she was psychiatrically admitted for "acute psychosis" and treated with olanzapine. Client's discharge paperwork notes she was treated for agitation, acute psychosis, elevated CPK, encephalopathy, non-traumatic rhabdomyolysis, and musculoskeletal hypermobility. w/ request to follow up at [facility] for CSF autoimmune panel, no psychiatric care follow-up indicated on paperwork. Client reports improvement in symptoms with olanzapine.  [FreeTextEntry3] : risperidone -  February 2024-June 2024 olanzapine 5mg -  February 2025- present

## 2025-05-09 NOTE — PHYSICAL EXAM
[Average] : average [Cooperative] : cooperative [Euthymic] : euthymic [Full] : full [Clear] : clear [Linear/Goal Directed] : linear/goal directed [WNL] : within normal limits [de-identified] : unable to formally assess
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
## 2025-05-23 NOTE — PHYSICAL EXAM
[Average] : average [Cooperative] : cooperative [Euthymic] : euthymic [Full] : full [Constricted] : constricted [Clear] : clear [Linear/Goal Directed] : linear/goal directed [WNL] : within normal limits [FreeTextEntry7] : slight paranoia  [de-identified] : unable to formally assess

## 2025-05-23 NOTE — HISTORY OF PRESENT ILLNESS
[Not Applicable] : Not applicable [FreeTextEntry1] : Client is a 23-year-old, cis-gender, , female, employed as , domiciled with family (mother, sister, grandmother) in private residence, no past hx of SI/I/P, no past hx of HI/I/P, no substance use, no trauma hx, hx of hypothyroidism ( treated w/ levothyroxine) with 1 past psychiatric hospitalization (February 2025) for "acute psychosis", 1 past CPEP visit (February 2024- not admitted- prescribed risperidone), hx of outpatient mental health services (psychotherapy & psychiatry Feb 2024-June 2024), currently presenting for follow-up psychotherapy.  [FreeTextEntry3] : risperidone -  February 2024-June 2024 olanzapine 5mg -  February 2025- present

## 2025-05-23 NOTE — RISK ASSESSMENT
[Clinical Interview] : Clinical Interview [No] : No [Psychotic disorder] : psychotic disorder [Psychosis] : psychosis [Change in provider or treatment (i.e., medications, psychotherapy, milieu)] : change in provider or treatment (i.e., medications, psychotherapy, milieu) [Recent inpatient discharge] : recent inpatient discharge [Recent onset of psychiatric illness] : recent onset of psychiatric illness [Identifies reasons for living] : identifies reasons for living [Supportive social network of family or friends] : supportive social network of family or friends [Ability to cope with stress] : ability to cope with stress [Positive therapeutic relationships] : positive therapeutic relationships [Responsibility to children, family, or others] : responsibility to children, family, or others [Frustration tolerance] : frustration tolerance [Engaged in work or school] : engaged in work or school [TextBox_32] : No evidence of past or current SI/I/P [None in the patient's lifetime] : None in the patient's lifetime [None Known] : none known [No known risk factors] : No known risk factors [Residential stability] : residential stability [Relationship stability] : relationship stability [Affective stability] : affective stability [Sobriety] : sobriety [Engagement in treatment] : engagement in treatment [No, patient denies ideation or behavior] : No, patient denies ideation or behavior [FreeTextEntry8] : No evidence of SI/I/P [FreeTextEntry7] : No evidence of HI/I/P [FreeTextEntry9] : Client has chronic risk factor of hx of psychosis. Her risk is mitigated by no past SI/I/P, no past HI/I/P, no past psychiatric hospitalizations, educational attainment, stable housing, employment, no substance use, and motivation for treatment. Client has no acute risks and is suitable for outpatient level of care. Her risk is low.  [Low acute suicide risk] : Low acute suicide risk

## 2025-05-23 NOTE — PSYCHOSOCIAL ASSESSMENT
[None known] : None known [FreeTextEntry2] : Client has several strengths including motivation for psychotherapy, supportive family, long-term partner, hobbies and interests, educational attainment, and life goals [FreeTextEntry3] : Client and mother report that prior to February 2024, client had good social functioning [Competitive and integrated employment] : Competitive and integrated employment [35 hours or more] : 35 hours or more [Financially stable] : financially stable [Client's parents (biological, adoptive, stepparent)] : client's parents (biological, adoptive, stepparent) [No] : Patient has personal representation (legal guardian, representative payee, conservatorship)? No [FreeTextEntry1] : client currently working F/T but situation complicated by recent hospitalization  [FreeTextEntry7] : Amy (Mother)
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
## 2025-05-23 NOTE — REASON FOR VISIT
[Patient preference] : as per patient preference [Starting, patient seen in-person within last 6 months] : Telehealth services are being started as patient has seen in-person within last 6 months. [Telehealth (audio & video) - Individual/Group] : This visit was provided via telehealth using real-time 2-way audio visual technology. [Other Location: e.g. Home (Enter Location, City,State)___] : The patient, [unfilled], was located at [unfilled] at the time of the visit. [Patient's space is appropriate for telehealth and maintains privacy/confidentiality.] : Patient's space is appropriate for telehealth and maintains privacy/confidentiality. [Participant(s) identity verified] : Participant(s) identity verified. [Verbal consent obtained from patient/other participant(s)] : Verbal consent for telehealth/telephonic services obtained from patient/other participant(s) [FreeTextEntry4] : 9am [FreeTextEntry5] : 9:45am [Patient] : Patient [FreeTextEntry1] : Psychotherapy follow up

## 2025-05-23 NOTE — PLAN
[FreeTextEntry2] : "Prevent psychosis relapse" "Decrease work stress" "Improve family relationships"  [CBT for Psychosis] : CBT for Psychosis  [Integrative Therapy] : Integrative Therapy  [Supportive Therapy] : Supportive Therapy [de-identified] : Client arrived on time. Session focused on client's thoughts and feelings regarding interpersonal work dynamics. Writer and client discussed cognitive contributions to clt's stress at work, including her concern about sharing personal information about herself to others. Clt expressed worry that if she were to accidentally offend someone by sharing personal information, she may lose her job. Writer and clt discussed several examples of interpersonal stressful situations. Client and writer discussed attempting to broaden the net of "safe topics" and homework was assigned to explore topics that do not provoke anxiety/worry. She was in good behavioral control and receptive to all interventions. Interventions included: integrative psychotherapy, CBTp, rapport building, reinforcing client's coping skills.   [Recommended Frequency of Visits: ____] : Recommended frequency of visits: [unfilled] [Return in ____ week(s)] : Return in [unfilled] week(s) [FreeTextEntry1] : - continue enrollment in [facility] - medications as per Dr. Waite  - Continue weekly psychotherapy - SEES, Peer Support, as needed  [Medication education provided] : Medication education provided. [Rationale for medication choices, possible risks/precautions, benefits, alternative treatment choices, and consequences of non-treatment discussed] : Rationale for medication choices, possible risks/precautions, benefits, alternative treatment choices, and consequences of non-treatment discussed with patient/family/caregiver  [Order(s) for medication placed in [facility] EHR] : Medication [FreeTextEntry5] : #) continue olanzapine 5mg PO Qdaily #) RN visit for AIMS, VS, labs, Medical core visit #) continue individual therpay

## 2025-05-30 NOTE — PHYSICAL EXAM
[Average] : average [Cooperative] : cooperative [Euthymic] : euthymic [Full] : full [Constricted] : constricted [Clear] : clear [Linear/Goal Directed] : linear/goal directed [WNL] : within normal limits [FreeTextEntry7] : slight paranoia  [de-identified] : unable to formally assess

## 2025-05-30 NOTE — PLAN
[FreeTextEntry2] : "Prevent psychosis relapse" "Decrease work stress" "Improve family relationships"  [CBT for Psychosis] : CBT for Psychosis  [Integrative Therapy] : Integrative Therapy  [Supportive Therapy] : Supportive Therapy [de-identified] : Client arrived on time. Session focused on client's thoughts and feelings regarding interpersonal work dynamics. Writer and client discussed cognitive contributions to clt's stress at work and concept of "coming up with alternative explanations" to ease anxiety. Writer and clt discussed several examples of interpersonal stressful situations. Client and writr also discussed writer's thoughts and feelings about her current medication and her frustration with weight gain despite intensive exerice, client to follow up with psychiatrist. She was in good behavioral control and receptive to all interventions. Interventions included: integrative psychotherapy, CBTp, rapport building, reinforcing client's coping skills.   [Recommended Frequency of Visits: ____] : Recommended frequency of visits: [unfilled] [Return in ____ week(s)] : Return in [unfilled] week(s) [FreeTextEntry1] : - continue enrollment in [facility] - medications as per Dr. Waite  - Continue weekly psychotherapy - SEES, Peer Support, as needed  [FreeTextEntry4] :   Treatment Planning: "Prevent psychosis relapse" "Decrease work stress" "Improve family relationships". [FreeTextEntry5] : - continue medications as per Dr. Waite - continue [facility] enrollment  -continue weekly psychotherapy with Dr. Martinez
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
## 2025-06-06 NOTE — PHYSICAL EXAM
[Average] : average [Cooperative] : cooperative [Euthymic] : euthymic [Full] : full [Constricted] : constricted [Clear] : clear [Linear/Goal Directed] : linear/goal directed [WNL] : within normal limits [FreeTextEntry7] : slight paranoia  [de-identified] : unable to formally assess

## 2025-06-06 NOTE — PLAN
[Medication education provided] : Medication education provided. [Rationale for medication choices, possible risks/precautions, benefits, alternative treatment choices, and consequences of non-treatment discussed] : Rationale for medication choices, possible risks/precautions, benefits, alternative treatment choices, and consequences of non-treatment discussed with patient/family/caregiver  [Order(s) for medication placed in [facility] EHR] : Medication [FreeTextEntry5] : #) cross-taper plan to start on 6/24/25: Week 1: Olanzapine 2.5mg PO QHS and Abilify 5mg PO QHS; Week 2: Abiify 7mg  #) RN visit for AIMS, VS, labs, Medical core visit #) continue individual therapy

## 2025-06-06 NOTE — PHYSICAL EXAM
[Average] : average [Cooperative] : cooperative [Euthymic] : euthymic [Constricted] : constricted [Clear] : clear [Linear/Goal Directed] : linear/goal directed [WNL] : within normal limits [de-identified] : fair [de-identified] : fair

## 2025-06-06 NOTE — HISTORY OF PRESENT ILLNESS
[Not Applicable] : Not applicable [FreeTextEntry1] : Client is a 23-year-old, cis-gender, , female, employed as , domiciled with family (mother, sister, grandmother) in private residence, no past hx of SI/I/P, no past hx of HI/I/P, no substance use, no trauma hx, hx of hypothyroidism ( treated w/ levothyroxine) with 1 past psychiatric hospitalization (February 2025) for "acute psychosis", 1 past CPEP visit (February 2024- not admitted- prescribed risperidone), hx of outpatient mental health services (psychotherapy & psychiatry Feb 2024-June 2024), currently presenting for eligibility screen for [facility] services in setting of recent hospital discharge for acute psychosis.  [FreeTextEntry2] : Client reported no past hx of mental health services prior to February 2024. In February 2024 client began her first job, which she found stressful. Her mother noticed changes in her behavior including speech latency, alogia, affective blunting, and mild paranoia (fears someone from work was following her, worried about office gossip/bullying). At mother's encouragement client went to [facility], was treated for "psychosis with paranoia" with Risperidone and was not admitted. Client then saw private psychiatrist (Daniel Danielle MD) and psychotherapist (Jackson Felix) until June 2024, when she discontinued risperidone due to increased prolactin levels w/ amenorrhea and lactation. Client reports psychotherapy was helpful in coping with discontinuation of medication and work stressors. Client and mother report no symptoms from June 2024-January 2025. In January 2025, both client and mother noticed changes in client's behavior, including "brain fog", pervasive indecisiveness/aboulomania, slow speech latency, alogia, insomnia, sensitivity to touch, sensitivity to ambient noise, and mild mistrust/suspiciousness. Mother reports this presentation waxed and waned. She denied auditory or visual hallucinations, no evidence of adrienne. Client then presented to [facility] where she was psychiatrically admitted for "acute psychosis" and treated with olanzapine. Client's discharge paperwork notes she was treated for agitation, acute psychosis, elevated CPK, encephalopathy, non-traumatic rhabdomyolysis, and musculoskeletal hypermobility. w/ request to follow up at [facility] for CSF autoimmune panel, no psychiatric care follow-up indicated on paperwork. Client reports improvement in symptoms with olanzapine.  [FreeTextEntry3] : risperidone -  February 2024-June 2024 olanzapine 5mg -  February 2025- present
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
## 2025-06-06 NOTE — PLAN
[FreeTextEntry2] : "Prevent psychosis relapse" "Decrease work stress" "Improve family relationships"  [Integrative Therapy] : Integrative Therapy  [Supportive Therapy] : Supportive Therapy [de-identified] : Client arrived on time. Session focused on client's thoughts and feelings regarding interpersonal family dynamics; frustrations with work/career; and her struggles with enjoying "the small things" in life. Writer and client discussed impact of FEP on her mood. Interventions included: integrative psychotherapy. She was in good behavioral control and receptive to all interventions. Interventions included: integrative psychotherapy, CBTp, rapport building, reinforcing client's coping skills.   [Recommended Frequency of Visits: ____] : Recommended frequency of visits: [unfilled] [Return in ____ week(s)] : Return in [unfilled] week(s) [FreeTextEntry1] : - continue enrollment in [facility] - medications as per Dr. Waite  - Continue weekly psychotherapy - SEES, Peer Support, as needed  [FreeTextEntry4] :   Treatment Planning: "Prevent psychosis relapse" "Decrease work stress" "Improve family relationships". [FreeTextEntry5] : - continue medications as per Dr. Waite - continue [facility] enrollment  -continue weekly psychotherapy with Dr. Martinez
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
## 2025-06-13 NOTE — PHYSICAL EXAM
[Average] : average [Cooperative] : cooperative [Euthymic] : euthymic [Full] : full [Constricted] : constricted [Clear] : clear [Linear/Goal Directed] : linear/goal directed [WNL] : within normal limits [FreeTextEntry7] : slight paranoia  [de-identified] : unable to formally assess

## 2025-06-13 NOTE — PLAN
[FreeTextEntry2] : "Prevent psychosis relapse" "Decrease work stress" "Improve family relationships"  [CBT for Psychosis] : CBT for Psychosis  [Supportive Therapy] : Supportive Therapy [de-identified] : Client arrived on time. Session focused on client's thoughts and feelings regarding work/career, family dynamics in context of her grandfather's ill health, and medications. Writer and client discussed an episode of paranoia during the week and client's ability to "think of alternative explanations" to decrease her anxiety. Writer and client also discussed client's attitudes towards medications and the positives and frustrations of having to take medications. Client expressed concerns about titrating to Abilify but her drive to decrease weight gain from Olanzapine as primary goal. Writer encouraged client to reach out to Dr. Waite with any side effects that might occur with the transition.  Interventions included: supportive psychotherapy, CBTp, Mary Garcia's personalized medicine approach , problem solving, reinforcing client's coping skills. Writer and client also discussed family involvement again and client provided writer with her mother's contact info. Core Sessions: Learning to Manage Difficult Feelings; Client was in good behavioral control and receptive to all interventions.  [Recommended Frequency of Visits: ____] : Recommended frequency of visits: [unfilled] [Return in ____ week(s)] : Return in [unfilled] week(s) [FreeTextEntry1] : - continue enrollment in [facility] - medications as per Dr. Waite  - Continue weekly psychotherapy - SEES, Peer Support, as needed  [FreeTextEntry4] :   Treatment Planning: "Prevent psychosis relapse" "Decrease work stress" "Improve family relationships". [FreeTextEntry5] : - continue medications as per Dr. Waite - continue [facility] enrollment  -continue weekly psychotherapy with Dr. Martinez

## 2025-06-13 NOTE — DISCUSSION/SUMMARY
[FreeTextEntry1] : Duration of session:   15 mins  [] 30 mins   [x] 45 mins  [] 60 mins   [] 90 mins  [] 120 mins  [] Other  []   Type of note:    Individual [x]  Group  []   Location of the service :   Clinic [] Community  []   [enter here the time spent in the community] Virtual  [x]      Purpose/ goal of session:   Increase Support  [x] Accompany to appointment  [] Provide non-clinical crisis support  [] Enhance Motivation  [] Travel Training  [] Model Coping Skills  [] Assist with access to services  [] Linkage to recovery support  [] Educate about various models of recovery [] Develop recovery plan  []   Outcome (Briefly describe results of meeting: Participant arrived on time.  Session was focused on Relationship Building.  The plan is to talk again in two weeks to talk about scheduling another meeting.

## 2025-06-27 NOTE — PHYSICAL EXAM
[Average] : average [Cooperative] : cooperative [Euthymic] : euthymic [Full] : full [Constricted] : constricted [Clear] : clear [Linear/Goal Directed] : linear/goal directed [WNL] : within normal limits [FreeTextEntry7] : slight paranoia  [de-identified] : unable to formally assess
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
## 2025-06-27 NOTE — PLAN
[FreeTextEntry2] : "Prevent psychosis relapse" "Decrease work stress" "Improve family relationships"  [Supportive Therapy] : Supportive Therapy [de-identified] : Client arrived on time. Session focused on client's thoughts and feelings regarding psychiatric medications and her current cross taper to Abilify. Client and writer also discussed client's experience of hospitalization and the paranoia and fears she experienced.  Interventions included: supportive psychotherapy, Mary Garcia's personalized medicine approach. Writer and client also discussed family involvement again and client provided writer with her mother's contact info. Core Sessions: Learning to Manage Difficult Feelings; Client was in good behavioral control and receptive to all interventions.  [Recommended Frequency of Visits: ____] : Recommended frequency of visits: [unfilled] [Return in ____ week(s)] : Return in [unfilled] week(s) [FreeTextEntry1] : - continue enrollment in [facility] - medications as per Dr. Waite  - Continue weekly psychotherapy - SEES, Peer Support, as needed  [FreeTextEntry4] :   Treatment Planning: "Prevent psychosis relapse" "Decrease work stress" "Improve family relationships". [FreeTextEntry5] : - continue medications as per Dr. Waite - continue [facility] enrollment  -continue weekly psychotherapy with Dr. Martinez

## 2025-07-11 NOTE — PLAN
[FreeTextEntry2] : "Prevent psychosis relapse" "Decrease work stress" "Improve family relationships"  [CBT for Psychosis] : CBT for Psychosis  [Supportive Therapy] : Supportive Therapy [de-identified] : Client arrived on time. Session focused on client's thoughts and feelings regarding a stressful situation at work where client felt angry and upset because she believed a co-worker was trying to set her up for embarrassment. Writer and client reviewed the situation and looked for potential alternative explanations for the behavior of the people involved in the incident. Writer and client practiced mentalizing the co-worker and supervisor to better understand the situation and discussed "occum's razor" regarding explanations. Interventions included: integrative psychotherapy, CBTp, supportive psychotherapy.   Client was in good behavioral control and receptive to all interventions.  [Recommended Frequency of Visits: ____] : Recommended frequency of visits: [unfilled] [Return in ____ week(s)] : Return in [unfilled] week(s) [FreeTextEntry1] : - continue enrollment in [facility] - medications as per Dr. Waite  - Continue weekly psychotherapy - SEES, Peer Support, as needed  [FreeTextEntry4] :   Treatment Planning: "Prevent psychosis relapse" "Decrease work stress" "Improve family relationships". [FreeTextEntry5] : - continue medications as per Dr. Waite - continue [facility] enrollment  -continue weekly psychotherapy with Dr. Martinez

## 2025-07-11 NOTE — PLAN
[Medication education provided] : Medication education provided. [Rationale for medication choices, possible risks/precautions, benefits, alternative treatment choices, and consequences of non-treatment discussed] : Rationale for medication choices, possible risks/precautions, benefits, alternative treatment choices, and consequences of non-treatment discussed with patient/family/caregiver  [Order(s) for medication placed in [facility] EHR] : Medication [FreeTextEntry5] : #) Continue on Abilify 7mg PO Kay NOTE: cross-taper on 6/24/25: Week 1: Olanzapine 2.5mg PO QHS and Abilify 5mg PO QHS; Week 2: Abiify 7mg  #) RN visit for AIMS, VS, labs, Medical core visit #) continue individual therapy
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
## 2025-07-11 NOTE — DISCUSSION/SUMMARY
[FreeTextEntry1] : Duration of session:   15 mins  [] 30 mins   [x] 45 mins  [] 60 mins   [] 90 mins  [] 120 mins  [] Other  []   Type of note:    Individual [x]  Group  []   Location of the service :   Clinic [] Community  []   [enter here the time spent in the community] Virtual  [x]      Purpose/ goal of session:   Increase Support  [x] Accompany to appointment  [] Provide non-clinical crisis support  [] Enhance Motivation  [] Travel Training  [] Model Coping Skills  [] Assist with access to services  [] Linkage to recovery support  [] Educate about various models of recovery [] Develop recovery plan  []   Outcome (Briefly describe results of meeting: The participant arrived on time.  The session was focused on relationship building.  The plan is to for Monie to reach out when she would like to speak again.

## 2025-07-11 NOTE — REASON FOR VISIT
[Patient preference] : as per patient preference [Continuing, patient not seen in-person within last 12 months (provide details below)] : Telehealth services are continuing, patient not seen in-person within last 12 months.  [Telehealth (audio & video) - Individual/Group] : This visit was provided via telehealth using real-time 2-way audio visual technology. [Other Location: e.g. Home (Enter Location, City,State)___] : The provider was located at [unfilled]. [Home] : The patient, [unfilled], was located at home, [unfilled], at the time of the visit. [Patient's space is appropriate for telehealth and maintains privacy/confidentiality.] : Patient's space is appropriate for telehealth and maintains privacy/confidentiality. [Participant(s) identity verified] : Participant(s) identity verified. [Patient] : Patient [FreeTextEntry1] : Psychiatric follow up for psychosis
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
## 2025-07-11 NOTE — END OF VISIT
[Time Spent: ___ minutes] : I have spent [unfilled] minutes of time on the encounter which excludes teaching and separately reported services. Burow's Graft Text: The defect edges were debeveled with a #15 scalpel blade.  Given the location of the defect, shape of the defect, the proximity to free margins and the presence of a standing cone deformity a Burow's skin graft was deemed most appropriate. The standing cone was removed and this tissue was then trimmed to the shape of the primary defect. The adipose tissue was also removed until only dermis and epidermis were left.  The skin margins of the secondary defect were undermined to an appropriate distance in all directions utilizing iris scissors.  The secondary defect was closed with interrupted buried subcutaneous sutures.  The skin edges were then re-apposed with running  sutures.  The skin graft was then placed in the primary defect and oriented appropriately.

## 2025-07-11 NOTE — PHYSICAL EXAM
[Average] : average [Cooperative] : cooperative [Euthymic] : euthymic [Full] : full [Constricted] : constricted [Clear] : clear [Linear/Goal Directed] : linear/goal directed [WNL] : within normal limits [FreeTextEntry7] : slight paranoia  [de-identified] : unable to formally assess

## 2025-07-11 NOTE — PHYSICAL EXAM
[Average] : average [Cooperative] : cooperative [Euthymic] : euthymic [Constricted] : constricted [Clear] : clear [Linear/Goal Directed] : linear/goal directed [WNL] : within normal limits [de-identified] : fair [de-identified] : fair

## 2025-07-18 NOTE — PHYSICAL EXAM
[Average] : average [Cooperative] : cooperative [Euthymic] : euthymic [Full] : full [Constricted] : constricted [Clear] : clear [Linear/Goal Directed] : linear/goal directed [WNL] : within normal limits [FreeTextEntry7] : slight paranoia  [de-identified] : unable to formally assess
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
## 2025-07-25 NOTE — PHYSICAL EXAM
[Average] : average [Cooperative] : cooperative [Euthymic] : euthymic [Full] : full [Constricted] : constricted [Clear] : clear [Linear/Goal Directed] : linear/goal directed [WNL] : within normal limits [FreeTextEntry7] : slight paranoia  [de-identified] : unable to formally assess

## 2025-07-25 NOTE — PLAN
[FreeTextEntry2] : "Prevent psychosis relapse" "Decrease work stress" "Improve family relationships"  [Supportive Therapy] : Supportive Therapy [de-identified] : Client arrived on time. Session focused on client's thoughts and feelings regarding transition to adulthood, work/career, and past experiences she misses regarding college. Writer and client explored ways she can bring elements she misses about college into her life (e.g., learning, socializing). Client discussed her plans to return to grad school and her lack of fulfilment with her current job.  Interventions included: supportive psychotherapy.   Client was in good behavioral control and receptive to all interventions.  [Recommended Frequency of Visits: ____] : Recommended frequency of visits: [unfilled] [Return in ____ week(s)] : Return in [unfilled] week(s) [FreeTextEntry1] : - continue enrollment in [facility] - medications as per Dr. Waite  - Continue weekly psychotherapy - SEES, Peer Support, as needed  [FreeTextEntry4] :   Treatment Planning: "Prevent psychosis relapse" "Decrease work stress" "Improve family relationships". [FreeTextEntry5] : - continue medications as per Dr. Waite - continue [facility] enrollment  -continue weekly psychotherapy with Dr. Martinez
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
